# Patient Record
Sex: FEMALE | Race: OTHER | HISPANIC OR LATINO
[De-identification: names, ages, dates, MRNs, and addresses within clinical notes are randomized per-mention and may not be internally consistent; named-entity substitution may affect disease eponyms.]

---

## 2018-09-05 PROBLEM — Z00.00 ENCOUNTER FOR PREVENTIVE HEALTH EXAMINATION: Status: ACTIVE | Noted: 2018-09-05

## 2018-10-05 ENCOUNTER — APPOINTMENT (OUTPATIENT)
Dept: CARDIOLOGY | Facility: CLINIC | Age: 63
End: 2018-10-05
Payer: MEDICARE

## 2018-10-05 VITALS
HEART RATE: 68 BPM | WEIGHT: 129 LBS | DIASTOLIC BLOOD PRESSURE: 60 MMHG | BODY MASS INDEX: 23.74 KG/M2 | RESPIRATION RATE: 14 BRPM | SYSTOLIC BLOOD PRESSURE: 92 MMHG | HEIGHT: 62 IN

## 2018-10-05 DIAGNOSIS — Z78.9 OTHER SPECIFIED HEALTH STATUS: ICD-10-CM

## 2018-10-05 PROCEDURE — 99204 OFFICE O/P NEW MOD 45 MIN: CPT

## 2018-10-05 PROCEDURE — 99244 OFF/OP CNSLTJ NEW/EST MOD 40: CPT

## 2018-10-05 PROCEDURE — 93000 ELECTROCARDIOGRAM COMPLETE: CPT

## 2018-10-05 RX ORDER — OLMESARTAN MEDOXOMIL 40 MG/1
TABLET, FILM COATED ORAL DAILY
Refills: 0 | Status: DISCONTINUED | COMMUNITY
End: 2018-10-05

## 2018-10-09 ENCOUNTER — RESULT REVIEW (OUTPATIENT)
Age: 63
End: 2018-10-09

## 2018-10-09 LAB
25(OH)D3 SERPL-MCNC: 33.1 NG/ML
ALBUMIN SERPL ELPH-MCNC: 4.6 G/DL
ALP BLD-CCNC: 60 U/L
ALT SERPL-CCNC: 29 U/L
ANION GAP SERPL CALC-SCNC: 10 MMOL/L
AST SERPL-CCNC: 21 U/L
BILIRUB SERPL-MCNC: 0.4 MG/DL
BUN SERPL-MCNC: 23 MG/DL
CALCIUM SERPL-MCNC: 10.1 MG/DL
CHLORIDE SERPL-SCNC: 103 MMOL/L
CO2 SERPL-SCNC: 30 MMOL/L
CREAT SERPL-MCNC: 0.76 MG/DL
GLUCOSE SERPL-MCNC: 96 MG/DL
HBA1C MFR BLD HPLC: 5.8 %
POTASSIUM SERPL-SCNC: 5.1 MMOL/L
PROT SERPL-MCNC: 7.4 G/DL
SODIUM SERPL-SCNC: 142 MMOL/L
TSH SERPL-ACNC: 2 UIU/ML

## 2018-10-10 LAB
BASOPHILS # BLD AUTO: 0.05 K/UL
BASOPHILS NFR BLD AUTO: 0.6 %
CHOLEST SERPL-MCNC: 210 MG/DL
CHOLEST/HDLC SERPL: 3.5 RATIO
EOSINOPHIL # BLD AUTO: 0.22 K/UL
EOSINOPHIL NFR BLD AUTO: 2.5 %
HCT VFR BLD CALC: 44.1 %
HDLC SERPL-MCNC: 60 MG/DL
HGB BLD-MCNC: 13.5 G/DL
IMM GRANULOCYTES NFR BLD AUTO: 0.3 %
LDLC SERPL CALC-MCNC: 128 MG/DL
LYMPHOCYTES # BLD AUTO: 2.75 K/UL
LYMPHOCYTES NFR BLD AUTO: 31.2 %
MAN DIFF?: NORMAL
MCHC RBC-ENTMCNC: 28.8 PG
MCHC RBC-ENTMCNC: 30.6 GM/DL
MCV RBC AUTO: 94.2 FL
MONOCYTES # BLD AUTO: 0.71 K/UL
MONOCYTES NFR BLD AUTO: 8.1 %
NEUTROPHILS # BLD AUTO: 5.05 K/UL
NEUTROPHILS NFR BLD AUTO: 57.3 %
PLATELET # BLD AUTO: 330 K/UL
RBC # BLD: 4.68 M/UL
RBC # FLD: 13.4 %
TRIGL SERPL-MCNC: 109 MG/DL
WBC # FLD AUTO: 8.81 K/UL

## 2018-11-07 ENCOUNTER — APPOINTMENT (OUTPATIENT)
Dept: CARDIOLOGY | Facility: CLINIC | Age: 63
End: 2018-11-07
Payer: MEDICARE

## 2018-11-07 PROCEDURE — 93925 LOWER EXTREMITY STUDY: CPT

## 2018-11-12 ENCOUNTER — APPOINTMENT (OUTPATIENT)
Dept: CARDIOLOGY | Facility: CLINIC | Age: 63
End: 2018-11-12
Payer: MEDICARE

## 2018-11-12 PROCEDURE — 93015 CV STRESS TEST SUPVJ I&R: CPT

## 2018-11-12 PROCEDURE — 93306 TTE W/DOPPLER COMPLETE: CPT

## 2018-12-11 ENCOUNTER — APPOINTMENT (OUTPATIENT)
Dept: CARDIOLOGY | Facility: CLINIC | Age: 63
End: 2018-12-11
Payer: MEDICARE

## 2018-12-11 VITALS
HEART RATE: 84 BPM | BODY MASS INDEX: 24.84 KG/M2 | DIASTOLIC BLOOD PRESSURE: 70 MMHG | RESPIRATION RATE: 16 BRPM | SYSTOLIC BLOOD PRESSURE: 133 MMHG | HEIGHT: 62 IN | WEIGHT: 135 LBS

## 2018-12-11 PROCEDURE — 93000 ELECTROCARDIOGRAM COMPLETE: CPT

## 2018-12-11 PROCEDURE — 99212 OFFICE O/P EST SF 10 MIN: CPT

## 2019-01-11 ENCOUNTER — APPOINTMENT (OUTPATIENT)
Dept: CARDIOLOGY | Facility: CLINIC | Age: 64
End: 2019-01-11

## 2019-03-06 ENCOUNTER — APPOINTMENT (OUTPATIENT)
Dept: NEUROLOGY | Facility: CLINIC | Age: 64
End: 2019-03-06

## 2021-03-29 ENCOUNTER — APPOINTMENT (OUTPATIENT)
Dept: CARDIOLOGY | Facility: CLINIC | Age: 66
End: 2021-03-29
Payer: MEDICARE

## 2021-03-29 VITALS
SYSTOLIC BLOOD PRESSURE: 152 MMHG | HEART RATE: 96 BPM | RESPIRATION RATE: 16 BRPM | HEIGHT: 62 IN | DIASTOLIC BLOOD PRESSURE: 85 MMHG | WEIGHT: 128 LBS | BODY MASS INDEX: 23.55 KG/M2

## 2021-03-29 VITALS — SYSTOLIC BLOOD PRESSURE: 142 MMHG | DIASTOLIC BLOOD PRESSURE: 88 MMHG

## 2021-03-29 DIAGNOSIS — R09.89 OTHER SPECIFIED SYMPTOMS AND SIGNS INVOLVING THE CIRCULATORY AND RESPIRATORY SYSTEMS: ICD-10-CM

## 2021-03-29 PROCEDURE — 99214 OFFICE O/P EST MOD 30 MIN: CPT

## 2021-03-29 PROCEDURE — 93000 ELECTROCARDIOGRAM COMPLETE: CPT

## 2021-03-29 RX ORDER — GABAPENTIN 300 MG/1
300 CAPSULE ORAL DAILY
Refills: 0 | Status: DISCONTINUED | COMMUNITY
End: 2021-03-29

## 2021-03-29 RX ORDER — ATORVASTATIN CALCIUM 40 MG/1
40 TABLET, FILM COATED ORAL AT BEDTIME
Refills: 0 | Status: ACTIVE | COMMUNITY

## 2021-03-29 RX ORDER — ZOLPIDEM TARTRATE 10 MG/1
10 TABLET ORAL
Refills: 0 | Status: DISCONTINUED | COMMUNITY
Start: 2018-10-05 | End: 2021-03-29

## 2021-03-29 RX ORDER — ZOLPIDEM TARTRATE 10 MG/1
10 TABLET, FILM COATED ORAL
Refills: 0 | Status: ACTIVE | COMMUNITY

## 2021-03-29 NOTE — DISCUSSION/SUMMARY
[FreeTextEntry1] : 1).  Patient's blood pressure slightly elevated today while admitting to being under a great deal of stress recently.  She also has been getting some associated headaches from time to time.\par \par Counseled her on diet and lifestyle modification including low sodium, low fat and low carbohydrate diet. She is to implement aerobic exercise regimen few days per week for improving overall cardiovascular health and stress management. \par \par Will consider starting her on hypertensive medication if blood pressure still high on return office visit.\par \par 2).  She is to complete a Transthoracic Echocardiogram and Carotid Doppler in the near future to assess cardiac function, valvulopathy and for signs of cardiomegaly as well as carotid plaquing stenosis respectively.\par \par Patient given blood work script to be done prior to follow up visit.\par \par 3).  Follow up with PCP (Dr. Miller) regarding routine checkups and blood work, have copy faxed to our office. \par \par 4).  Recommend patient report any untoward symptoms. \par \par 5).  Follow up with our office in 1 month or PRN.

## 2021-03-29 NOTE — ASSESSMENT
[FreeTextEntry1] : EKG 3/29/2021:  The EKG illustrates normal sinus rhythm, rate of 86 bpm, rSR prime V1, general low voltage tracing throughout.  Essentially unchanged.\par \par

## 2021-03-29 NOTE — PHYSICAL EXAM
[General Appearance - Well Developed] : well developed [Normal Appearance] : normal appearance [General Appearance - Well Nourished] : well nourished [General Appearance - In No Acute Distress] : no acute distress [Normal Conjunctiva] : the conjunctiva exhibited no abnormalities [Normal Oral Mucosa] : normal oral mucosa [Normal Jugular Venous A Waves Present] : normal jugular venous A waves present [Normal Jugular Venous V Waves Present] : normal jugular venous V waves present [No Jugular Venous Butterfield A Waves] : no jugular venous butterfield A waves [] : no respiratory distress [Respiration, Rhythm And Depth] : normal respiratory rhythm and effort [Auscultation Breath Sounds / Voice Sounds] : lungs were clear to auscultation bilaterally [Heart Rate And Rhythm] : heart rate and rhythm were normal [Heart Sounds] : normal S1 and S2 [Edema] : no peripheral edema present [Bowel Sounds] : normal bowel sounds [Abnormal Walk] : normal gait [Nail Clubbing] : no clubbing of the fingernails [Cyanosis, Localized] : no localized cyanosis [Skin Color & Pigmentation] : normal skin color and pigmentation [Oriented To Time, Place, And Person] : oriented to person, place, and time [Impaired Insight] : insight and judgment were intact [Affect] : the affect was normal [FreeTextEntry1] : slightly anxious

## 2021-03-29 NOTE — HISTORY OF PRESENT ILLNESS
[FreeTextEntry1] : She does admit to being under a great deal of stress lately and is wondering if that could be the problem;\par \par Patient completed an Echocardiogram, Exercise Stress test and 24 Hour Holter monitor in 2018 which revealed no significant cardiac abnormalities at that time;\par \par Tolerating current medical regimen without difficulty including Atorvastatin daily;\par \par Transthoracic Echocardiogram (11/12/2018):  Normal LV size and function with an LVEF of 55 to 60%. The left and right atria normal in size and structure.  There is trace AR, MR and UT;\par \par Exercise Stress Test (11/12/2018):  Patient exercised for 5 METS and developed leg fatigue and dizziness during the test that resolved with rest.  The resting blood pressure was (122/80).  Patient developed no dysrhythmias. The EKG was negative for ischemia;\par \par 24 Hour Holter monitor (10/5/2018):  The presenting rhythm was sinus with average heart rate of 78 bpm (Max 142, Min 51). There were no episodes of VT or SVT, no ventricular premature beats, no significant pauses or blocks. There was 1 isolated PAC noted;

## 2021-03-29 NOTE — REASON FOR VISIT
[FreeTextEntry1] : The patient is a 65-year-old Cameroonian speaking female with known history of hyperlipidemia and mildly abnormal EKG who presents today with having some unusually elevated blood pressures lately, she reports averaging in the 150's to 160's over 90's;\par \par Spoke to patient via using  with [employer];\par \par She also has previous history for experiencing palpitations and shortness of breath but states this has not been an issue for awhile;\par \par She does report experiencing some headaches lately as well but no other associated symptoms such as CP, SOB, MAYER, palpitations, presyncope, syncope, edema;

## 2021-04-15 ENCOUNTER — APPOINTMENT (OUTPATIENT)
Dept: CARDIOLOGY | Facility: CLINIC | Age: 66
End: 2021-04-15
Payer: MEDICARE

## 2021-04-15 PROCEDURE — 93880 EXTRACRANIAL BILAT STUDY: CPT

## 2021-04-22 ENCOUNTER — APPOINTMENT (OUTPATIENT)
Dept: CARDIOLOGY | Facility: CLINIC | Age: 66
End: 2021-04-22
Payer: MEDICARE

## 2021-04-22 PROCEDURE — 93306 TTE W/DOPPLER COMPLETE: CPT

## 2021-04-29 ENCOUNTER — APPOINTMENT (OUTPATIENT)
Dept: CARDIOLOGY | Facility: CLINIC | Age: 66
End: 2021-04-29
Payer: MEDICARE

## 2021-04-29 ENCOUNTER — NON-APPOINTMENT (OUTPATIENT)
Age: 66
End: 2021-04-29

## 2021-04-29 VITALS
HEIGHT: 60 IN | RESPIRATION RATE: 16 BRPM | BODY MASS INDEX: 24.94 KG/M2 | WEIGHT: 127 LBS | SYSTOLIC BLOOD PRESSURE: 136 MMHG | DIASTOLIC BLOOD PRESSURE: 86 MMHG | TEMPERATURE: 98.2 F | HEART RATE: 78 BPM

## 2021-04-29 DIAGNOSIS — G62.9 POLYNEUROPATHY, UNSPECIFIED: ICD-10-CM

## 2021-04-29 PROCEDURE — 93000 ELECTROCARDIOGRAM COMPLETE: CPT

## 2021-04-29 PROCEDURE — 99214 OFFICE O/P EST MOD 30 MIN: CPT

## 2021-04-29 RX ORDER — ASPIRIN ENTERIC COATED TABLETS 81 MG 81 MG/1
81 TABLET, DELAYED RELEASE ORAL DAILY
Qty: 90 | Refills: 1 | Status: ACTIVE | COMMUNITY

## 2021-04-29 RX ORDER — MULTIVIT,IRON,MINERALS/LUTEIN
TABLET ORAL DAILY
Refills: 0 | Status: ACTIVE | COMMUNITY

## 2021-04-29 RX ORDER — MELOXICAM 15 MG/1
15 TABLET ORAL
Refills: 0 | Status: DISCONTINUED | COMMUNITY
End: 2021-04-29

## 2021-04-29 NOTE — REASON FOR VISIT
[FreeTextEntry1] : The patient is a pleasant 66-year-old  female with a past medical history significant for hyperlipidemia, GERD, palpitations, mild valvular abnormalities, who presents for follow up evaluation.

## 2021-04-29 NOTE — REVIEW OF SYSTEMS
[Numbness (Hypoesthesia)] : numbness [Tingling (Paresthesia)] : tingling [Negative] : Heme/Lymph [FreeTextEntry9] : Left foot pain [de-identified] : left foot

## 2021-04-29 NOTE — HISTORY OF PRESENT ILLNESS
[FreeTextEntry1] : Mrs. Mendoza presents today feeling well.  Denies complaints of chest pain, shortness of breath, palpitations, lightheadedness or syncope.  States her blood pressures have been running much better at home 120-130s/70-80s.  Her main complaint is that of intermittent left foot pain with paresthesias.  Has had 2 surgeries and spinal injections with no relief.  Had a negative arterial duplex in 2018 with Dr. Payan which was negative for atherosclerotic plaque or hemodynamically significant stenosis of the lower extremites.

## 2021-04-29 NOTE — DISCUSSION/SUMMARY
[FreeTextEntry1] : 1 - Hypertension:  blood pressure well controlled.  Patient advised to follow strict low sodium diet.\par \par 2 - Hyperlipidemia:  cholesterol 134, triglycerides 111, HDL 53, LDL 61.  Continue Atorvastatin 40mg daily.  Follow low fat, low cholesterol diet.  Will have blood work through PCPs office and labs to be forwarded to us.\par \par 3 - Echocardiogram (4/22/2021):  EF 60-65%.  RIght and left atria are normal in size and structure.  There is a lipomatous hypertrophy of the interatrial septum, without evidence of shunting.  Mild aortic valve sclerosis without stenosis.  Trace aortic regurgitation.  Trace mitral valve regurgitation.\par \par 4 - Carotid duplex (4/15/2021):  normal bilateral carotid duplex ultrasound exam.\par \par 5 - Left foot pain/paresthesias:  Patient with negative arterial duplex in 2018, has been followed by ortho and had 2 surgeries, and spinal injections without relief.  Will refer to Dr. Rose Velásquez for further evaluation.\par \par 6 - Follow up with Dr. Payan in 1 year.

## 2021-04-29 NOTE — PHYSICAL EXAM
[Well Developed] : well developed [Well Nourished] : well nourished [No Acute Distress] : no acute distress [Normal Conjunctiva] : normal conjunctiva [Normal Venous Pressure] : normal venous pressure [No Carotid Bruit] : no carotid bruit [Normal S1, S2] : normal S1, S2 [No Murmur] : no murmur [No Rub] : no rub [No Gallop] : no gallop [Clear Lung Fields] : clear lung fields [Good Air Entry] : good air entry [No Respiratory Distress] : no respiratory distress  [Normal Bowel Sounds] : normal bowel sounds [Normal Gait] : normal gait [No Edema] : no edema [No Cyanosis] : no cyanosis [No Clubbing] : no clubbing [No Varicosities] : no varicosities [No Rash] : no rash [No Skin Lesions] : no skin lesions [Moves all extremities] : moves all extremities [No Focal Deficits] : no focal deficits [Normal Speech] : normal speech [Alert and Oriented] : alert and oriented [Normal memory] : normal memory

## 2023-04-28 ENCOUNTER — APPOINTMENT (OUTPATIENT)
Dept: CARDIOLOGY | Facility: CLINIC | Age: 68
End: 2023-04-28
Payer: MEDICARE

## 2023-04-28 VITALS
BODY MASS INDEX: 25.13 KG/M2 | DIASTOLIC BLOOD PRESSURE: 72 MMHG | WEIGHT: 128 LBS | HEART RATE: 78 BPM | SYSTOLIC BLOOD PRESSURE: 114 MMHG | HEIGHT: 60 IN | RESPIRATION RATE: 16 BRPM

## 2023-04-28 PROCEDURE — 93000 ELECTROCARDIOGRAM COMPLETE: CPT

## 2023-04-28 PROCEDURE — 99214 OFFICE O/P EST MOD 30 MIN: CPT

## 2023-04-28 RX ORDER — RANITIDINE HYDROCHLORIDE 150 MG/1
150 CAPSULE ORAL DAILY
Refills: 0 | Status: DISCONTINUED | COMMUNITY
End: 2023-04-28

## 2023-04-28 RX ORDER — PREGABALIN 100 MG/1
100 CAPSULE ORAL
Refills: 0 | Status: ACTIVE | COMMUNITY

## 2023-05-23 ENCOUNTER — NON-APPOINTMENT (OUTPATIENT)
Age: 68
End: 2023-05-23

## 2023-05-23 ENCOUNTER — APPOINTMENT (OUTPATIENT)
Dept: CARDIOLOGY | Facility: CLINIC | Age: 68
End: 2023-05-23
Payer: MEDICARE

## 2023-05-23 VITALS
SYSTOLIC BLOOD PRESSURE: 128 MMHG | WEIGHT: 128 LBS | BODY MASS INDEX: 25.13 KG/M2 | RESPIRATION RATE: 16 BRPM | HEART RATE: 76 BPM | HEIGHT: 60 IN | DIASTOLIC BLOOD PRESSURE: 78 MMHG

## 2023-05-23 DIAGNOSIS — F41.9 ANXIETY DISORDER, UNSPECIFIED: ICD-10-CM

## 2023-05-23 DIAGNOSIS — M79.604 PAIN IN RIGHT LEG: ICD-10-CM

## 2023-05-23 DIAGNOSIS — M79.605 PAIN IN RIGHT LEG: ICD-10-CM

## 2023-05-23 PROCEDURE — 99214 OFFICE O/P EST MOD 30 MIN: CPT

## 2023-05-23 PROCEDURE — 93000 ELECTROCARDIOGRAM COMPLETE: CPT

## 2023-05-23 RX ORDER — AMLODIPINE BESYLATE 5 MG/1
5 TABLET ORAL DAILY
Qty: 90 | Refills: 1 | Status: ACTIVE | COMMUNITY
Start: 2023-05-23 | End: 1900-01-01

## 2023-05-23 RX ORDER — ENALAPRIL MALEATE 5 MG/1
5 TABLET ORAL DAILY
Refills: 0 | Status: DISCONTINUED | COMMUNITY
End: 2023-05-23

## 2023-05-23 NOTE — REASON FOR VISIT
[FreeTextEntry1] : The patient is a pleasant 68-year-old  female with a past medical history significant for hyperlipidemia, GERD, palpitations, mild valvular abnormalities, who presents for follow up evaluation.

## 2023-05-23 NOTE — HISTORY OF PRESENT ILLNESS
[FreeTextEntry1] : Mrs. Mendoza presents today stating that over the past several weeks she has "not been feeling well" and her blood pressure has been elevated. (138-157/78-94, average 150/88).  Has an overwhelming feeling come over her, her hands and feet get very cold and experiences "a very fast heart beat."  She restarted taking her enalapril 5mg daily when she started getting these high readings.  She does not feel that "the medication is working."  Continues to experience the numbness and tingling in her middle toe on her left foot.  Was seen by her PCP on Saturday and he "did some tests."  She has not gotten the results yet.

## 2023-05-23 NOTE — DISCUSSION/SUMMARY
[EKG obtained to assist in diagnosis and management of assessed problem(s)] : EKG obtained to assist in diagnosis and management of assessed problem(s) [FreeTextEntry1] : 1 - Hypertension:  blood pressure well controlled.  Patient has not been taking enalapril 5mg daily for quite some time.  Follow low sodium diet.\par \par 2 - Hyperlipidemia:  no recent labs.  Continue Atorvastatin 40mg daily.  Follow low fat, low cholesterol diet.  Fasting blood work prior to follow up visit.\par \par 3 - Left foot pain/paresthesias:  numbness/tingling of her middle toe on her left foot and occasional coolness to that foot.  She has has surgery twice on that foot and had negative arterial duplex back in 2018 with same symptoms.  Recommended vascular surgery at that time and did not go.\par \par 4 - Follow up with Dr. Payan in 3 months.

## 2023-05-23 NOTE — CARDIOLOGY SUMMARY
[de-identified] : Sinus rhythm at 76 bpm.  Low voltage in precordial leads.  Non-specific ST-T changes.

## 2023-05-23 NOTE — REVIEW OF SYSTEMS
[Palpitations] : palpitations [Numbness (Hypoesthesia)] : numbness [Tingling (Paresthesia)] : tingling [Anxiety] : anxiety [Negative] : Heme/Lymph [FreeTextEntry9] : Left foot pain [de-identified] : left foot

## 2023-05-23 NOTE — PHYSICAL EXAM
[Well Developed] : well developed [Well Nourished] : well nourished [No Acute Distress] : no acute distress [Normal Conjunctiva] : normal conjunctiva [Normal Venous Pressure] : normal venous pressure [No Carotid Bruit] : no carotid bruit [Normal S1, S2] : normal S1, S2 [No Murmur] : no murmur [No Rub] : no rub [No Gallop] : no gallop [Clear Lung Fields] : clear lung fields [No Respiratory Distress] : no respiratory distress  [Normal Bowel Sounds] : normal bowel sounds [Normal Gait] : normal gait [No Edema] : no edema [No Rash] : no rash [No Skin Lesions] : no skin lesions [Moves all extremities] : moves all extremities [No Focal Deficits] : no focal deficits [Normal Speech] : normal speech [Alert and Oriented] : alert and oriented [Normal memory] : normal memory

## 2023-05-23 NOTE — REVIEW OF SYSTEMS
[Numbness (Hypoesthesia)] : numbness [Tingling (Paresthesia)] : tingling [Negative] : Heme/Lymph [FreeTextEntry9] : Left foot pain [de-identified] : left foot

## 2023-05-23 NOTE — HISTORY OF PRESENT ILLNESS
[FreeTextEntry1] : Mrs. Mendoza presents today after not following up with us in 2 years.  She is presently without complaints of exertional chest pain, shortness of breath, palpitations, lightheadedness or syncope.  Admits that she has not taken enalapril in a very long time.  Her biggest complaint is that of numbness/tingling of her middle toe on her left foot and occasional coolness to that foot.  She has has surgery twice on that foot.  She was experiencing this same issue when last seen two years ago.
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
## 2023-06-23 ENCOUNTER — APPOINTMENT (OUTPATIENT)
Dept: CARDIOLOGY | Facility: CLINIC | Age: 68
End: 2023-06-23

## 2023-06-29 ENCOUNTER — APPOINTMENT (OUTPATIENT)
Dept: CARDIOLOGY | Facility: CLINIC | Age: 68
End: 2023-06-29
Payer: MEDICARE

## 2023-06-29 PROCEDURE — 93306 TTE W/DOPPLER COMPLETE: CPT

## 2023-07-20 ENCOUNTER — NON-APPOINTMENT (OUTPATIENT)
Age: 68
End: 2023-07-20

## 2023-07-20 ENCOUNTER — APPOINTMENT (OUTPATIENT)
Dept: CARDIOLOGY | Facility: CLINIC | Age: 68
End: 2023-07-20
Payer: MEDICARE

## 2023-07-20 VITALS
RESPIRATION RATE: 14 BRPM | WEIGHT: 128 LBS | BODY MASS INDEX: 25.13 KG/M2 | HEIGHT: 60 IN | DIASTOLIC BLOOD PRESSURE: 80 MMHG | SYSTOLIC BLOOD PRESSURE: 124 MMHG | HEART RATE: 89 BPM

## 2023-07-20 PROCEDURE — 93000 ELECTROCARDIOGRAM COMPLETE: CPT

## 2023-07-20 PROCEDURE — 99214 OFFICE O/P EST MOD 30 MIN: CPT

## 2023-07-20 NOTE — CARDIOLOGY SUMMARY
[de-identified] : SInus rhythm at 89 bpm.  Low voltage in precordial leads.  Non-specific ST-T changes.

## 2023-07-20 NOTE — DISCUSSION/SUMMARY
[FreeTextEntry1] : 1 - Hypertension:  blood pressure well controlled on current medications.  Advised to follow low sodium diet.\par \par 2 - Valvular heart disease:  patient with known mild to moderate mitral valve regurgitation.  Had follow up echocardiogram (6/29/2023) which revealed normal left ventricular cavity size with EF of 60-65%.  The right and left atria are normal in size and structure.  Lipomatous interatrial septal hypertrophy present.  Trace aortic regurgitation.  Trivial mitral regurgitation.  Trace tricuspid regurgitation.  The inferior vena cava is dilated measuring 2.4 cm in diameter.  Mildly elevated right atrial pressure.  Estimated pulmonary artery systolic pressure is 21 mmHg.\par \par 3 - Hyperlipidemia:  no recent labs.  Continue Atorvastatin 40mg daily.  Follow low fat, low cholesterol diet. Patient is scheduled to have fasting blood work on Saturday.  Will ask to have results forwarded to our office once available.\par \par 4 - Palpitations:  Has not experienced any further palpitations or "fast heart beat."  Wore the 30-day ambulatory event monitor just for a couple of days because she "had a lot going on."  2 days of reports revealed baseline rhythm of sinus with average heart rate of 85 bpm (max 143, min 62.  No atrial fibrillation, SVT, pauses, heart block or VT.  PVC and PAC burden of zero%.  Keeps caffeine intake to a minimum, hydrates well and has been getting adequate sleep.  Will continue to monitor herself and will call our office should she begin to experience the palpitations again.  If this does occur we can reorder the ambulatory event monitor for further evaluation, but at this time, the patient states that she is feeling "great."\par \par 5 - Follow up with Dr. Payan in 3 months. [EKG obtained to assist in diagnosis and management of assessed problem(s)] : EKG obtained to assist in diagnosis and management of assessed problem(s)

## 2023-11-17 NOTE — HISTORY OF PRESENT ILLNESS
[FreeTextEntry1] : Mrs. Mendoza presents today feeling "great".  She is without complaints of exertional chest pain, shortness of breath, lightheadedness or syncope.  Has not experienced any further palpitations or "fast heart beat."  States she is tolerating the addition of amlodipine very well as it has been maintaining her blood pressure well controlled.   Patient with one or more new problems requiring additional work-up/treatment.

## 2023-12-06 ENCOUNTER — INPATIENT (INPATIENT)
Facility: HOSPITAL | Age: 68
LOS: 0 days | Discharge: HOME | DRG: 694 | End: 2023-12-07
Attending: STUDENT IN AN ORGANIZED HEALTH CARE EDUCATION/TRAINING PROGRAM | Admitting: STUDENT IN AN ORGANIZED HEALTH CARE EDUCATION/TRAINING PROGRAM
Payer: COMMERCIAL

## 2023-12-06 VITALS
RESPIRATION RATE: 16 BRPM | OXYGEN SATURATION: 98 % | WEIGHT: 123.46 LBS | SYSTOLIC BLOOD PRESSURE: 153 MMHG | DIASTOLIC BLOOD PRESSURE: 83 MMHG | TEMPERATURE: 99 F | HEART RATE: 82 BPM

## 2023-12-06 DIAGNOSIS — N20.0 CALCULUS OF KIDNEY: ICD-10-CM

## 2023-12-06 LAB
ALBUMIN SERPL ELPH-MCNC: 4.5 G/DL — SIGNIFICANT CHANGE UP (ref 3.3–5.2)
ALP SERPL-CCNC: 83 U/L — SIGNIFICANT CHANGE UP (ref 40–120)
ALT FLD-CCNC: 21 U/L — SIGNIFICANT CHANGE UP
ANION GAP SERPL CALC-SCNC: 12 MMOL/L — SIGNIFICANT CHANGE UP (ref 5–17)
APPEARANCE UR: ABNORMAL
AST SERPL-CCNC: 17 U/L — SIGNIFICANT CHANGE UP
BACTERIA # UR AUTO: NEGATIVE /HPF — SIGNIFICANT CHANGE UP
BASOPHILS # BLD AUTO: 0.05 K/UL — SIGNIFICANT CHANGE UP (ref 0–0.2)
BASOPHILS NFR BLD AUTO: 0.3 % — SIGNIFICANT CHANGE UP (ref 0–2)
BILIRUB SERPL-MCNC: 0.4 MG/DL — SIGNIFICANT CHANGE UP (ref 0.4–2)
BILIRUB UR-MCNC: NEGATIVE — SIGNIFICANT CHANGE UP
BUN SERPL-MCNC: 17.9 MG/DL — SIGNIFICANT CHANGE UP (ref 8–20)
CALCIUM SERPL-MCNC: 10.1 MG/DL — SIGNIFICANT CHANGE UP (ref 8.4–10.5)
CAST: 1 /LPF — SIGNIFICANT CHANGE UP (ref 0–4)
CHLORIDE SERPL-SCNC: 104 MMOL/L — SIGNIFICANT CHANGE UP (ref 96–108)
CO2 SERPL-SCNC: 28 MMOL/L — SIGNIFICANT CHANGE UP (ref 22–29)
COLOR SPEC: YELLOW — SIGNIFICANT CHANGE UP
CREAT SERPL-MCNC: 0.51 MG/DL — SIGNIFICANT CHANGE UP (ref 0.5–1.3)
DIFF PNL FLD: ABNORMAL
EGFR: 102 ML/MIN/1.73M2 — SIGNIFICANT CHANGE UP
EOSINOPHIL # BLD AUTO: 0 K/UL — SIGNIFICANT CHANGE UP (ref 0–0.5)
EOSINOPHIL NFR BLD AUTO: 0 % — SIGNIFICANT CHANGE UP (ref 0–6)
GLUCOSE SERPL-MCNC: 142 MG/DL — HIGH (ref 70–99)
GLUCOSE UR QL: NEGATIVE MG/DL — SIGNIFICANT CHANGE UP
HCT VFR BLD CALC: 44 % — SIGNIFICANT CHANGE UP (ref 34.5–45)
HGB BLD-MCNC: 14.6 G/DL — SIGNIFICANT CHANGE UP (ref 11.5–15.5)
IMM GRANULOCYTES NFR BLD AUTO: 0.4 % — SIGNIFICANT CHANGE UP (ref 0–0.9)
KETONES UR-MCNC: NEGATIVE MG/DL — SIGNIFICANT CHANGE UP
LACTATE BLDV-MCNC: 2.8 MMOL/L — HIGH (ref 0.5–2)
LACTATE BLDV-MCNC: 3 MMOL/L — HIGH (ref 0.5–2)
LEUKOCYTE ESTERASE UR-ACNC: NEGATIVE — SIGNIFICANT CHANGE UP
LIDOCAIN IGE QN: 23 U/L — SIGNIFICANT CHANGE UP (ref 22–51)
LYMPHOCYTES # BLD AUTO: 0.9 K/UL — LOW (ref 1–3.3)
LYMPHOCYTES # BLD AUTO: 5.1 % — LOW (ref 13–44)
MCHC RBC-ENTMCNC: 30.6 PG — SIGNIFICANT CHANGE UP (ref 27–34)
MCHC RBC-ENTMCNC: 33.2 GM/DL — SIGNIFICANT CHANGE UP (ref 32–36)
MCV RBC AUTO: 92.2 FL — SIGNIFICANT CHANGE UP (ref 80–100)
MONOCYTES # BLD AUTO: 0.83 K/UL — SIGNIFICANT CHANGE UP (ref 0–0.9)
MONOCYTES NFR BLD AUTO: 4.7 % — SIGNIFICANT CHANGE UP (ref 2–14)
NEUTROPHILS # BLD AUTO: 15.96 K/UL — HIGH (ref 1.8–7.4)
NEUTROPHILS NFR BLD AUTO: 89.5 % — HIGH (ref 43–77)
NITRITE UR-MCNC: NEGATIVE — SIGNIFICANT CHANGE UP
OB PNL STL: NEGATIVE — SIGNIFICANT CHANGE UP
PH UR: 8 — SIGNIFICANT CHANGE UP (ref 5–8)
PLATELET # BLD AUTO: 320 K/UL — SIGNIFICANT CHANGE UP (ref 150–400)
POTASSIUM SERPL-MCNC: 4.2 MMOL/L — SIGNIFICANT CHANGE UP (ref 3.5–5.3)
POTASSIUM SERPL-SCNC: 4.2 MMOL/L — SIGNIFICANT CHANGE UP (ref 3.5–5.3)
PROT SERPL-MCNC: 7.3 G/DL — SIGNIFICANT CHANGE UP (ref 6.6–8.7)
PROT UR-MCNC: NEGATIVE MG/DL — SIGNIFICANT CHANGE UP
RBC # BLD: 4.77 M/UL — SIGNIFICANT CHANGE UP (ref 3.8–5.2)
RBC # FLD: 12.2 % — SIGNIFICANT CHANGE UP (ref 10.3–14.5)
RBC CASTS # UR COMP ASSIST: 10 /HPF — HIGH (ref 0–4)
SODIUM SERPL-SCNC: 144 MMOL/L — SIGNIFICANT CHANGE UP (ref 135–145)
SP GR SPEC: 1.01 — SIGNIFICANT CHANGE UP (ref 1–1.03)
SQUAMOUS # UR AUTO: 0 /HPF — SIGNIFICANT CHANGE UP (ref 0–5)
TROPONIN T, HIGH SENSITIVITY RESULT: 7 NG/L — SIGNIFICANT CHANGE UP (ref 0–51)
UROBILINOGEN FLD QL: 0.2 MG/DL — SIGNIFICANT CHANGE UP (ref 0.2–1)
WBC # BLD: 17.81 K/UL — HIGH (ref 3.8–10.5)
WBC # FLD AUTO: 17.81 K/UL — HIGH (ref 3.8–10.5)
WBC UR QL: 2 /HPF — SIGNIFICANT CHANGE UP (ref 0–5)

## 2023-12-06 PROCEDURE — 99285 EMERGENCY DEPT VISIT HI MDM: CPT | Mod: FS

## 2023-12-06 PROCEDURE — 93010 ELECTROCARDIOGRAM REPORT: CPT

## 2023-12-06 PROCEDURE — 74177 CT ABD & PELVIS W/CONTRAST: CPT | Mod: 26,MA

## 2023-12-06 RX ORDER — CEFTRIAXONE 500 MG/1
1000 INJECTION, POWDER, FOR SOLUTION INTRAMUSCULAR; INTRAVENOUS ONCE
Refills: 0 | Status: DISCONTINUED | OUTPATIENT
Start: 2023-12-06 | End: 2023-12-06

## 2023-12-06 RX ORDER — CEFTRIAXONE 500 MG/1
1000 INJECTION, POWDER, FOR SOLUTION INTRAMUSCULAR; INTRAVENOUS ONCE
Refills: 0 | Status: COMPLETED | OUTPATIENT
Start: 2023-12-06 | End: 2023-12-06

## 2023-12-06 RX ORDER — SODIUM CHLORIDE 9 MG/ML
750 INJECTION INTRAMUSCULAR; INTRAVENOUS; SUBCUTANEOUS ONCE
Refills: 0 | Status: COMPLETED | OUTPATIENT
Start: 2023-12-06 | End: 2023-12-06

## 2023-12-06 RX ORDER — SODIUM CHLORIDE 9 MG/ML
1000 INJECTION INTRAMUSCULAR; INTRAVENOUS; SUBCUTANEOUS ONCE
Refills: 0 | Status: COMPLETED | OUTPATIENT
Start: 2023-12-06 | End: 2023-12-06

## 2023-12-06 RX ORDER — KETOROLAC TROMETHAMINE 30 MG/ML
15 SYRINGE (ML) INJECTION ONCE
Refills: 0 | Status: DISCONTINUED | OUTPATIENT
Start: 2023-12-06 | End: 2023-12-06

## 2023-12-06 RX ORDER — ONDANSETRON 8 MG/1
4 TABLET, FILM COATED ORAL ONCE
Refills: 0 | Status: COMPLETED | OUTPATIENT
Start: 2023-12-06 | End: 2023-12-06

## 2023-12-06 RX ORDER — MORPHINE SULFATE 50 MG/1
4 CAPSULE, EXTENDED RELEASE ORAL ONCE
Refills: 0 | Status: DISCONTINUED | OUTPATIENT
Start: 2023-12-06 | End: 2023-12-06

## 2023-12-06 RX ADMIN — SODIUM CHLORIDE 750 MILLILITER(S): 9 INJECTION INTRAMUSCULAR; INTRAVENOUS; SUBCUTANEOUS at 18:14

## 2023-12-06 RX ADMIN — ONDANSETRON 4 MILLIGRAM(S): 8 TABLET, FILM COATED ORAL at 17:31

## 2023-12-06 RX ADMIN — SODIUM CHLORIDE 1000 MILLILITER(S): 9 INJECTION INTRAMUSCULAR; INTRAVENOUS; SUBCUTANEOUS at 17:31

## 2023-12-06 RX ADMIN — MORPHINE SULFATE 4 MILLIGRAM(S): 50 CAPSULE, EXTENDED RELEASE ORAL at 20:51

## 2023-12-06 RX ADMIN — CEFTRIAXONE 1000 MILLIGRAM(S): 500 INJECTION, POWDER, FOR SOLUTION INTRAMUSCULAR; INTRAVENOUS at 18:14

## 2023-12-06 RX ADMIN — Medication 15 MILLIGRAM(S): at 17:31

## 2023-12-06 NOTE — ED ADULT NURSE NOTE - OBJECTIVE STATEMENT
c/o right flank pain x1 mo but pain worsen today along with n/v. also endorse painful urination. AO4. Sepsis workup.

## 2023-12-06 NOTE — ED ADULT NURSE NOTE - NS ED NURSE PATIENT LEFT UNIT TIME
You may be receiving a patient experience survey by mail or e-mail from the Urgent Care Staff regarding your visit today. We value your feedback and hope you can provide us your insight. Take the antibiotic as directed. Lots of fluids. Follow up with new or worsening symptoms. Patient Education     Urinary Tract Infections in Women  Urinary tract infections (UTIs) are most often caused by bacteria (germs). These bacteria enter the urinary tract. The bacteria may come from outside the body. Or they may travel from the skin outside theÂ rectum or vagina into the urethra. Female anatomy makes it easier for bacteria from the bowelÂ to enter a womanâs urinary tract, which is the most common source of UTI. This means women develop UTIs more often than men. Pain in or around the urinary tract is a common UTI symptom. But the only way to know for sure if you have a UTI for the health care provider to test your urine. The two tests that may be done are the urinalysis and urine culture. Types of UTIs  Â· Cystitis: A bladder infection (cystitis) is the most common UTI in women. You may have urgent or frequent urination. You may also haveÂ pain, burning when you urinate, and bloody urine. Â· Urethritis: This is an inflamed urethra, which is the tube that carries urine from the bladder to outside the body. You may have lower stomach or back pain. You may also have urgent or frequent urination. Â· Pyelonephritis: This is a kidney infection. If not treated, it can be serious and damage your kidneys. In severe cases, you may be hospitalized. You may have a fever and lower back pain. Medications to treat a UTI  Most UTIs are treated with antibiotics. These kill the bacteria. The length of time you need to take them depends on the type of infection. It may be as short as 3 days. If you have repeated UTIs, a low-dose antibiotic may be needed for several months. Take antibiotics exactly as directed.  Donât stop taking them until all of the medication is gone. If you stop taking the antibiotic too soon, the infection may not go away, and you may develop a resistance to the antibiotic. This can make it much harder to treat. Lifestyle changes to treat and prevent UTIs  The lifestyle changes below will help get rid of your UTI. They may also help prevent future UTIs. Â· Drink plenty of fluids. This includes water, juice, or other caffeine-free drinks. Fluids help flush bacteria out of your body. Â· Empty your bladder. Always empty your bladder when you feel the urge to urinate. And always urinate before going to sleep. Urine that stays in your bladder can lead to infection. Try to urinate before and after sex as well. Â· Practice good personal hygiene. Wipe yourself from front to back after using the toilet. This helps keep bacteria from getting into the urethra. Â· Use condoms during sex. These help prevent UTIs caused by sexually transmitted bacteria. Also, avoid using spermicides during sex. These can increase the risk of UTIs. Choose other forms of birth control instead. For women who tend to get UTIs after sex, a low-dose of a preventive antibiotic may be used. Be sure to discuss this option with your health care provider. Â· Follow up with your health care provider as directed. He or she may test to make sure the infection has cleared. If necessary, additional treatment may be started. Â© [address] [facility] [place] [address] [address], [place], [address]. All rights reserved. This information is not intended as a substitute for professional medical care. Always follow your healthcare professional's instructions. 01:06

## 2023-12-06 NOTE — ED ADULT NURSE NOTE - NSFALLUNIVINTERV_ED_ALL_ED
Bed/Stretcher in lowest position, wheels locked, appropriate side rails in place/Call bell, personal items and telephone in reach/Instruct patient to call for assistance before getting out of bed/chair/stretcher/Non-slip footwear applied when patient is off stretcher/[place] to call system/Physically safe environment - no spills, clutter or unnecessary equipment/Purposeful proactive rounding/Room/bathroom lighting operational, light cord in reach Bed/Stretcher in lowest position, wheels locked, appropriate side rails in place/Call bell, personal items and telephone in reach/Instruct patient to call for assistance before getting out of bed/chair/stretcher/Non-slip footwear applied when patient is off stretcher/[place] to call system/Physically safe environment - no spills, clutter or unnecessary equipment/Purposeful proactive rounding/Room/bathroom lighting operational, light cord in reach

## 2023-12-06 NOTE — ED PROVIDER NOTE - CLINICAL SUMMARY MEDICAL DECISION MAKING FREE TEXT BOX
Hx and physical as noted. concern for pyelonephritis vs infected renal stone. no concern for dissection at this time given clinical picture. pulses equal in upper extremities. low suspicion for cardiac etiology as pt has epigastric tenderness. will get screening EKG, troponin, imaging and symptom control. disposition and further intervention pending. Hx and physical as noted. concern for pyelonephritis vs infected renal stone. no concern for dissection at this time given clinical picture. pulses equal in lower and upper extremities. low suspicion for cardiac etiology to explain epigastric pain, current clinical picture more consistent with  etiology. will get screening EKG, troponin (epigastric pain without tenderness), imaging and symptom control. disposition and further intervention pending.

## 2023-12-06 NOTE — ED PROVIDER NOTE - NS ED ROS FT
REVIEW OF SYSTEMS:  General:  (+) fever, no chills  HEENT: no headache, no vision changes  Cardiac: no chest pain, no palpitations  Respiratory: no cough, no shortness of breath  Gastrointestinal: (+) abdominal pain, (+) nausea, (+) vomiting, no diarrhea, no melena, no hematochezia   Genitourinary: no hematuria, (+) dysuria, no urinary frequency, no urinary hesitancy, no vaginal bleeding or abnormal discharge, (+) black stool  Extremities: no extremity swelling, no extremity pain  Neuro: no focal weakness, no numbness/tingling of the extremities, no decreased sensation  Heme: no easy bleeding, no easy bruising, no anemia  Skin: no abrasions, no jaundice, no pruritis, no rashes, no lesions  -Gisella Soriano MD Attending Physician REVIEW OF SYSTEMS:  General:  (+) fever, no chills  HEENT: no headache, no vision changes  Cardiac: no chest pain, no palpitations  Respiratory: no cough, no shortness of breath  Gastrointestinal: (+) abdominal pain, (+) nausea, (+) vomiting, no diarrhea, + melena, no hematochezia   Genitourinary: no hematuria, (+) dysuria, no urinary frequency, no urinary hesitancy,  (+) black stool  -Gisella Soriano MD Attending Physician

## 2023-12-06 NOTE — ED PROVIDER NOTE - OBJECTIVE STATEMENT
ED : Emiliano, 69 y/o female with PMHx of HLD presents to the ED c/o right lower back pain since this morning associated with subjective fevers, N/V, and dysuria. Pt also endorsing black stool for a couple days. Recently diagnosed with right kidney stone with failed laser lithotripsy. Pt is scheduled for a second procedure in March. Pt admits to one month of epigastric abdominal pain radiating to back. Pt is following up with here PCP for this complaint and is scheduled for an endoscopy tomorrow. No medication PTA. ED : Emiliano, 67 y/o female with PMHx of HLD presents to the ED c/o right lower back pain since this morning associated with subjective fevers, N/V, and dysuria. Pt also endorsing black stool for a couple days. Recently diagnosed with right kidney stone with failed laser lithotripsy. Pt is scheduled for a second procedure in March. Pt admits to one month of epigastric abdominal pain radiating to back. Pt is following up with here PCP for this complaint and is scheduled for an endoscopy tomorrow. No medication PTA.

## 2023-12-06 NOTE — ED PROVIDER NOTE - PHYSICAL EXAMINATION
PHYSICAL EXAM:   General: well-appearing, appears stated age, not in extremis   HEENT: NC/AT, PERRLA, conjunctiva pink, airway patent  Cardiovascular: regular rate and rhythm, + S1/S2, no murmurs, rubs, gallops appreciated  Respiratory: clear to auscultation bilaterally, good aeration bilaterally, nonlabored respirations  Abdominal: soft, right flank tenderness, nondistended, no rebound, guarding or rigidity  Back: right costovertebral tenderness, no rashes noted  Extremities: no LE edema b/l. Radial pulses equal and strong b/l  Neuro: Alert and oriented x3. Moving all extremities. Ambulatory.  Psychiatric: appropriate mood and affect.   Skin: appropriate color, warm, dry.   Rectal: Chaperone: Dulce. No stool on digital rectal exam. No gross blood.   -Gisella Soriano MD Attending Physician PHYSICAL EXAM:   General: appears uncomfortable,  HEENT: NC/AT, airway patent  Cardiovascular: regular rate and rhythm, + S1/S2, no murmurs, rubs, gallops appreciated  Respiratory: clear to auscultation bilaterally, good aeration bilaterally, nonlabored respirations  Abdominal: soft, right flank tenderness, nondistended, no rebound, guarding or rigidity  Back: right costovertebral tenderness, no dermatomal rashes  Psychiatric: appropriate mood and affect.   Skin: chronic blanching erythematous rash diffuse to back  Rectal: Chaperone: Dulce. No stool on digital rectal exam. No gross blood.   -Gisella Soriano MD Attending Physician

## 2023-12-07 ENCOUNTER — TRANSCRIPTION ENCOUNTER (OUTPATIENT)
Age: 68
End: 2023-12-07

## 2023-12-07 VITALS
HEART RATE: 80 BPM | SYSTOLIC BLOOD PRESSURE: 131 MMHG | TEMPERATURE: 99 F | RESPIRATION RATE: 18 BRPM | DIASTOLIC BLOOD PRESSURE: 72 MMHG | OXYGEN SATURATION: 94 %

## 2023-12-07 LAB
ANION GAP SERPL CALC-SCNC: 10 MMOL/L — SIGNIFICANT CHANGE UP (ref 5–17)
BUN SERPL-MCNC: 15.1 MG/DL — SIGNIFICANT CHANGE UP (ref 8–20)
CALCIUM SERPL-MCNC: 9 MG/DL — SIGNIFICANT CHANGE UP (ref 8.4–10.5)
CHLORIDE SERPL-SCNC: 108 MMOL/L — SIGNIFICANT CHANGE UP (ref 96–108)
CO2 SERPL-SCNC: 24 MMOL/L — SIGNIFICANT CHANGE UP (ref 22–29)
CREAT SERPL-MCNC: 0.55 MG/DL — SIGNIFICANT CHANGE UP (ref 0.5–1.3)
EGFR: 100 ML/MIN/1.73M2 — SIGNIFICANT CHANGE UP
GLUCOSE SERPL-MCNC: 88 MG/DL — SIGNIFICANT CHANGE UP (ref 70–99)
HCT VFR BLD CALC: 39.2 % — SIGNIFICANT CHANGE UP (ref 34.5–45)
HCT VFR BLD CALC: 40 % — SIGNIFICANT CHANGE UP (ref 34.5–45)
HCV AB S/CO SERPL IA: 0.05 S/CO — SIGNIFICANT CHANGE UP (ref 0–0.99)
HCV AB SERPL-IMP: SIGNIFICANT CHANGE UP
HGB BLD-MCNC: 12.8 G/DL — SIGNIFICANT CHANGE UP (ref 11.5–15.5)
HGB BLD-MCNC: 13.1 G/DL — SIGNIFICANT CHANGE UP (ref 11.5–15.5)
LACTATE SERPL-SCNC: 2.2 MMOL/L — HIGH (ref 0.5–2)
LACTATE SERPL-SCNC: 2.7 MMOL/L — HIGH (ref 0.5–2)
MAGNESIUM SERPL-MCNC: 2.1 MG/DL — SIGNIFICANT CHANGE UP (ref 1.6–2.6)
MCHC RBC-ENTMCNC: 30.3 PG — SIGNIFICANT CHANGE UP (ref 27–34)
MCHC RBC-ENTMCNC: 30.8 PG — SIGNIFICANT CHANGE UP (ref 27–34)
MCHC RBC-ENTMCNC: 32.7 GM/DL — SIGNIFICANT CHANGE UP (ref 32–36)
MCHC RBC-ENTMCNC: 32.8 GM/DL — SIGNIFICANT CHANGE UP (ref 32–36)
MCV RBC AUTO: 92.7 FL — SIGNIFICANT CHANGE UP (ref 80–100)
MCV RBC AUTO: 94.1 FL — SIGNIFICANT CHANGE UP (ref 80–100)
PHOSPHATE SERPL-MCNC: 3.4 MG/DL — SIGNIFICANT CHANGE UP (ref 2.4–4.7)
PLATELET # BLD AUTO: 224 K/UL — SIGNIFICANT CHANGE UP (ref 150–400)
PLATELET # BLD AUTO: 267 K/UL — SIGNIFICANT CHANGE UP (ref 150–400)
POTASSIUM SERPL-MCNC: 4.5 MMOL/L — SIGNIFICANT CHANGE UP (ref 3.5–5.3)
POTASSIUM SERPL-SCNC: 4.5 MMOL/L — SIGNIFICANT CHANGE UP (ref 3.5–5.3)
RBC # BLD: 4.23 M/UL — SIGNIFICANT CHANGE UP (ref 3.8–5.2)
RBC # BLD: 4.25 M/UL — SIGNIFICANT CHANGE UP (ref 3.8–5.2)
RBC # FLD: 12.5 % — SIGNIFICANT CHANGE UP (ref 10.3–14.5)
SODIUM SERPL-SCNC: 142 MMOL/L — SIGNIFICANT CHANGE UP (ref 135–145)
WBC # BLD: 11.14 K/UL — HIGH (ref 3.8–10.5)
WBC # BLD: 12.29 K/UL — HIGH (ref 3.8–10.5)
WBC # FLD AUTO: 11.14 K/UL — HIGH (ref 3.8–10.5)
WBC # FLD AUTO: 12.29 K/UL — HIGH (ref 3.8–10.5)

## 2023-12-07 PROCEDURE — 99233 SBSQ HOSP IP/OBS HIGH 50: CPT

## 2023-12-07 PROCEDURE — 83690 ASSAY OF LIPASE: CPT

## 2023-12-07 PROCEDURE — 84484 ASSAY OF TROPONIN QUANT: CPT

## 2023-12-07 PROCEDURE — 87086 URINE CULTURE/COLONY COUNT: CPT

## 2023-12-07 PROCEDURE — 74177 CT ABD & PELVIS W/CONTRAST: CPT | Mod: MA

## 2023-12-07 PROCEDURE — 99285 EMERGENCY DEPT VISIT HI MDM: CPT | Mod: 25

## 2023-12-07 PROCEDURE — 80053 COMPREHEN METABOLIC PANEL: CPT

## 2023-12-07 PROCEDURE — 93005 ELECTROCARDIOGRAM TRACING: CPT

## 2023-12-07 PROCEDURE — 36415 COLL VENOUS BLD VENIPUNCTURE: CPT

## 2023-12-07 PROCEDURE — 85025 COMPLETE CBC W/AUTO DIFF WBC: CPT

## 2023-12-07 PROCEDURE — 96375 TX/PRO/DX INJ NEW DRUG ADDON: CPT

## 2023-12-07 PROCEDURE — 86803 HEPATITIS C AB TEST: CPT

## 2023-12-07 PROCEDURE — 85027 COMPLETE CBC AUTOMATED: CPT

## 2023-12-07 PROCEDURE — 83735 ASSAY OF MAGNESIUM: CPT

## 2023-12-07 PROCEDURE — 87040 BLOOD CULTURE FOR BACTERIA: CPT

## 2023-12-07 PROCEDURE — 80048 BASIC METABOLIC PNL TOTAL CA: CPT

## 2023-12-07 PROCEDURE — 84100 ASSAY OF PHOSPHORUS: CPT

## 2023-12-07 PROCEDURE — 96374 THER/PROPH/DIAG INJ IV PUSH: CPT

## 2023-12-07 PROCEDURE — 81001 URINALYSIS AUTO W/SCOPE: CPT

## 2023-12-07 PROCEDURE — 83605 ASSAY OF LACTIC ACID: CPT

## 2023-12-07 PROCEDURE — 82272 OCCULT BLD FECES 1-3 TESTS: CPT

## 2023-12-07 RX ORDER — CEFDINIR 250 MG/5ML
1 POWDER, FOR SUSPENSION ORAL
Qty: 14 | Refills: 0
Start: 2023-12-07 | End: 2023-12-13

## 2023-12-07 RX ORDER — TAMSULOSIN HYDROCHLORIDE 0.4 MG/1
0.4 CAPSULE ORAL ONCE
Refills: 0 | Status: COMPLETED | OUTPATIENT
Start: 2023-12-07 | End: 2023-12-07

## 2023-12-07 RX ORDER — KETOROLAC TROMETHAMINE 30 MG/ML
15 SYRINGE (ML) INJECTION ONCE
Refills: 0 | Status: DISCONTINUED | OUTPATIENT
Start: 2023-12-07 | End: 2023-12-07

## 2023-12-07 RX ORDER — PANTOPRAZOLE SODIUM 20 MG/1
40 TABLET, DELAYED RELEASE ORAL DAILY
Refills: 0 | Status: DISCONTINUED | OUTPATIENT
Start: 2023-12-07 | End: 2023-12-07

## 2023-12-07 RX ORDER — PANTOPRAZOLE SODIUM 20 MG/1
40 TABLET, DELAYED RELEASE ORAL ONCE
Refills: 0 | Status: COMPLETED | OUTPATIENT
Start: 2023-12-07 | End: 2023-12-07

## 2023-12-07 RX ORDER — ACETAMINOPHEN 500 MG
1000 TABLET ORAL ONCE
Refills: 0 | Status: COMPLETED | OUTPATIENT
Start: 2023-12-07 | End: 2023-12-07

## 2023-12-07 RX ORDER — TAMSULOSIN HYDROCHLORIDE 0.4 MG/1
1 CAPSULE ORAL
Qty: 0 | Refills: 0
Start: 2023-12-07

## 2023-12-07 RX ORDER — TAMSULOSIN HYDROCHLORIDE 0.4 MG/1
1 CAPSULE ORAL
Qty: 14 | Refills: 0
Start: 2023-12-07 | End: 2023-12-20

## 2023-12-07 RX ORDER — SODIUM CHLORIDE 9 MG/ML
1000 INJECTION INTRAMUSCULAR; INTRAVENOUS; SUBCUTANEOUS
Refills: 0 | Status: DISCONTINUED | OUTPATIENT
Start: 2023-12-07 | End: 2023-12-07

## 2023-12-07 RX ORDER — TAMSULOSIN HYDROCHLORIDE 0.4 MG/1
0.4 CAPSULE ORAL AT BEDTIME
Refills: 0 | Status: DISCONTINUED | OUTPATIENT
Start: 2023-12-07 | End: 2023-12-07

## 2023-12-07 RX ORDER — SODIUM CHLORIDE 9 MG/ML
1000 INJECTION INTRAMUSCULAR; INTRAVENOUS; SUBCUTANEOUS ONCE
Refills: 0 | Status: COMPLETED | OUTPATIENT
Start: 2023-12-07 | End: 2023-12-07

## 2023-12-07 RX ADMIN — SODIUM CHLORIDE 1000 MILLILITER(S): 9 INJECTION INTRAMUSCULAR; INTRAVENOUS; SUBCUTANEOUS at 06:32

## 2023-12-07 RX ADMIN — SODIUM CHLORIDE 75 MILLILITER(S): 9 INJECTION INTRAMUSCULAR; INTRAVENOUS; SUBCUTANEOUS at 01:03

## 2023-12-07 RX ADMIN — TAMSULOSIN HYDROCHLORIDE 0.4 MILLIGRAM(S): 0.4 CAPSULE ORAL at 01:03

## 2023-12-07 RX ADMIN — SODIUM CHLORIDE 75 MILLILITER(S): 9 INJECTION INTRAMUSCULAR; INTRAVENOUS; SUBCUTANEOUS at 09:15

## 2023-12-07 RX ADMIN — Medication 15 MILLIGRAM(S): at 06:31

## 2023-12-07 RX ADMIN — PANTOPRAZOLE SODIUM 40 MILLIGRAM(S): 20 TABLET, DELAYED RELEASE ORAL at 01:03

## 2023-12-07 RX ADMIN — PANTOPRAZOLE SODIUM 40 MILLIGRAM(S): 20 TABLET, DELAYED RELEASE ORAL at 08:24

## 2023-12-07 RX ADMIN — Medication 400 MILLIGRAM(S): at 08:24

## 2023-12-07 RX ADMIN — Medication 1000 MILLIGRAM(S): at 09:30

## 2023-12-07 NOTE — DISCHARGE NOTE NURSING/CASE MANAGEMENT/SOCIAL WORK - FLU SEASON?
Patient Education     Urinary Tract Infections in Women    Urinary tract infections (UTIs) are most often caused byÂ bacteria. These bacteria enter the urinary tract. The bacteria may come from outside the body. Or they may travel from the skin outside theÂ rectum or vagina into the urethra. Female anatomy makes it easy for bacteria from the bowelÂ to enter a womanâs urinary tract, which is the most common source of UTI. This means women develop UTIs more often than men. Pain in or around the urinary tract is a common UTI symptom. But the only way to know for sure if you have a UTI for the healthcare provider to test your urine. The two tests that may be done are the urinalysis and urine culture. Types of UTIs  Â· Cystitis. A bladder infection (cystitis) is the most common UTI in women. You may have urgent or frequent urination. You may also haveÂ pain, burning when you urinate, and bloody urine. Â· Urethritis. This is an inflamed urethra, which is the tube that carries urine from the bladder to outside the body. You may have lower stomach or back pain. You may also have urgent or frequent urination. Â· Pyelonephritis. This is a kidney infection. If not treated, it can be serious and damage your kidneys. In severe cases, you may need to stay in the hospital. You may have a fever and lower back pain. Medicines to treat a UTI  Most UTIs are treated with antibiotics. These kill the bacteria. The length of time you need to take them depends on the type of infection. It may be as short as 3 days. If you have repeated UTIs, you may need a low-dose antibioticÂ for several months. Take antibiotics exactly as directed. Donât stop taking them until all of the medicine is gone. If you stop taking the antibiotic too soon, the infection may not go away. You may also develop a resistance to the antibiotic. This can make it much harder to treat.   Lifestyle changes to treat and prevent UTIs  The lifestyle changes below will help get rid of your UTI. They may also help prevent future UTIs. Â· Drink plenty of fluids. This includes water, juice, or other caffeine-free drinks. Fluids help flush bacteria out of your body. Â· Empty your bladder. Always empty your bladder when you feel the urge to urinate. And always urinate before going to sleep. Urine that stays in your bladder can lead to infection. Try to urinate before and after sex as well. Â· Practice good personal hygiene. Wipe yourself from front to back after using the toilet. This helps keep bacteria from getting into the urethra. Â· Use condoms during sex. These help prevent UTIs caused by sexually transmitted bacteria. Also don't use spermicides during sex. These can increase the risk for UTIs. Choose other forms of birth control instead. For women who tend to get UTIs after sex, a low-dose of a preventive antibiotic may be used. Be sure to discuss this option with your healthcare provider. Â· Follow up with your healthcare provider as directed. He or she may test to make sure the infection has cleared. If needed, more treatment may be started. Date Last Reviewed: 1/1/2017  Â© 4971-0198 [employer] [place]. 2016 [facility], [facility] [address] All rights reserved. This information is not intended as a substitute for professional medical care. Always follow your healthcare professional's instructions. Yes...

## 2023-12-07 NOTE — PROGRESS NOTE ADULT - SUBJECTIVE AND OBJECTIVE BOX
Urology f/u:    Patient seen and examined at bedside this am - with  at bedside.   Patient awake, alert, responsive in no acute distress- says she had an appointment today with her GI Doctor in [place] for evaluation stomach problem.      patient denies any headache, dizziness, nausea, vomiting no palpitations and no voiding difficulty.     Vital Signs Last 24 Hrs  T(C): 36.7 (07 Dec 2023 11:47), Max: 37.7 (06 Dec 2023 16:53)  T(F): 98 (07 Dec 2023 11:47), Max: 99.8 (06 Dec 2023 16:53)  HR: 74 (07 Dec 2023 11:47) (74 - 103)  BP: 120/74 (07 Dec 2023 11:47) (98/56 - 120/78)  BP(mean): --  RR: 17 (07 Dec 2023 11:47) (15 - 17)  SpO2: 99% (07 Dec 2023 11:47) (95% - 99%)    Parameters below as of 07 Dec 2023 11:47  Patient On (Oxygen Delivery Method): room air    Back/Flank:  denies any flank pain or tenderness on palpation bilateral.   :  voids adequately  I&O's Detail- no report output.   Patient was instructed to strain her urine in case passes the stone .  2 MM stone UVJ.      labs:                        12.8   12.29 )-----------( 267      ( 07 Dec 2023 06:55 )             39.2     Chemistry:      142  |  108  |  15.1  ----------------------------<  88  4.5   |  24.0  |  0.55        repeat lactate:  continues down trend  2.2/ 2.7/ 2.8/3.0       Urology f/u:    Patient seen and examined at bedside this am - with  at bedside.   Patient awake, alert, responsive in no acute distress- says she had an appointment today with her GI Doctor in [place] for evaluation stomach problem.      patient denies any headache, dizziness, nausea, vomiting no palpitations and no voiding difficulty.     Vital Signs Last 24 Hrs  T(C): 36.7 (07 Dec 2023 11:47), Max: 37.7 (06 Dec 2023 16:53)  T(F): 98 (07 Dec 2023 11:47), Max: 99.8 (06 Dec 2023 16:53)  HR: 74 (07 Dec 2023 11:47) (74 - 103)  BP: 120/74 (07 Dec 2023 11:47) (98/56 - 120/78)  BP(mean): --  RR: 17 (07 Dec 2023 11:47) (15 - 17)  SpO2: 99% (07 Dec 2023 11:47) (95% - 99%)    Parameters below as of 07 Dec 2023 11:47  Patient On (Oxygen Delivery Method): room air    Back/Flank:  denies any flank pain or tenderness on palpation bilateral.   :  voids adequately  I&O's Detail- no report output.   Patient was instructed to strain her urine in case passes the stone .  2 MM stone UVJ.      labs:                        12.8   12.29 )-----------( 267      ( 07 Dec 2023 06:55 )             39.2     Chemistry:      142  |  108  |  15.1  ----------------------------<  88  4.5   |  24.0  |  0.55        repeat lactate:  continues down trend  2.2/ 2.7/ 2.8/3.0       Urology f/u:    Patient seen and examined at bedside this am - with  at bedside.   Patient awake, alert, responsive in no acute distress- says she had an appointment today with her GI Doctor in [place] for evaluation stomach problem.      patient denies any headache, dizziness, nausea, vomiting no palpitations and no voiding difficulty.     Vital Signs Last 24 Hrs  T(C): 36.7 (07 Dec 2023 11:47), Max: 37.7 (06 Dec 2023 16:53)  T(F): 98 (07 Dec 2023 11:47), Max: 99.8 (06 Dec 2023 16:53)  HR: 74 (07 Dec 2023 11:47) (74 - 103)  BP: 120/74 (07 Dec 2023 11:47) (98/56 - 120/78)  BP(mean): --  RR: 17 (07 Dec 2023 11:47) (15 - 17)  SpO2: 99% (07 Dec 2023 11:47) (95% - 99%)    Parameters below as of 07 Dec 2023 11:47  Patient On (Oxygen Delivery Method): room air    Back/Flank:  denies any flank pain or tenderness on palpation bilateral.   :  voids adequately  I&O's Detail- no report output.   Patient was instructed to strain her urine in case passes the stone .  2 MM stone UVJ.      labs:                        12.8   12.29 )-----------( 267      ( 07 Dec 2023 06:55 )             39.2     Chemistry:      142  |  108  |  15.1  ----------------------------<  88  4.5   |  24.0  |  0.55        repeat lactate:  continues down trend  2.2/ 2.7/ 2.8/3.0      Impression:  stable slowly improving : Lactate improved, WBC trending down , T. max. (( on admission.  CT scan with  right 2 mm ureteral stone at the UVJ with hydroureteronephrosis.   Pain managed with tylenol, encouraged to increase liquids.   Patient tolerating PO liquids and states is hungry- bland diet started.  Plan:  Discussed with patient and Dr. Malik - follow on diet and patient wishes to go home , will discharge patient with PO antibiotics and continue OTC pain medication for pain..  Also explained to patient to continue to strain her urine as stone is at edge of bladder can possible pass any time.      Addendum : Dr. Malik discussed with patient in Czech fully present situation and will discharge her home and to continue fluids, antibiotics  and straining her urine and that Dr. Wood' office will call to schedule follow up in office .  And if condition changes and you get worse or are concerned return to the ER.       Urology f/u:    Patient seen and examined at bedside this am - with  at bedside.   Patient awake, alert, responsive in no acute distress- says she had an appointment today with her GI Doctor in [place] for evaluation stomach problem.      patient denies any headache, dizziness, nausea, vomiting no palpitations and no voiding difficulty.     Vital Signs Last 24 Hrs  T(C): 36.7 (07 Dec 2023 11:47), Max: 37.7 (06 Dec 2023 16:53)  T(F): 98 (07 Dec 2023 11:47), Max: 99.8 (06 Dec 2023 16:53)  HR: 74 (07 Dec 2023 11:47) (74 - 103)  BP: 120/74 (07 Dec 2023 11:47) (98/56 - 120/78)  BP(mean): --  RR: 17 (07 Dec 2023 11:47) (15 - 17)  SpO2: 99% (07 Dec 2023 11:47) (95% - 99%)    Parameters below as of 07 Dec 2023 11:47  Patient On (Oxygen Delivery Method): room air    Back/Flank:  denies any flank pain or tenderness on palpation bilateral.   :  voids adequately  I&O's Detail- no report output.   Patient was instructed to strain her urine in case passes the stone .  2 MM stone UVJ.      labs:                        12.8   12.29 )-----------( 267      ( 07 Dec 2023 06:55 )             39.2     Chemistry:      142  |  108  |  15.1  ----------------------------<  88  4.5   |  24.0  |  0.55        repeat lactate:  continues down trend  2.2/ 2.7/ 2.8/3.0      Impression:  stable slowly improving : Lactate improved, WBC trending down , T. max. (( on admission.  CT scan with  right 2 mm ureteral stone at the UVJ with hydroureteronephrosis.   Pain managed with tylenol, encouraged to increase liquids.   Patient tolerating PO liquids and states is hungry- bland diet started.  Plan:  Discussed with patient and Dr. Mlaik - follow on diet and patient wishes to go home , will discharge patient with PO antibiotics and continue OTC pain medication for pain..  Also explained to patient to continue to strain her urine as stone is at edge of bladder can possible pass any time.      Addendum : Dr. Malik discussed with patient in Hungarian fully present situation and will discharge her home and to continue fluids, antibiotics  and straining her urine and that Dr. Wood' office will call to schedule follow up in office .  And if condition changes and you get worse or are concerned return to the ER.

## 2023-12-07 NOTE — DISCHARGE NOTE PROVIDER - HOSPITAL COURSE
67 y/o female with PMHx of HLD presents to the ED c/o right lower back pain since this morning associated with subjective fevers, N/V, and dysuria. Pt also endorsing black stool for a couple days. Recently diagnosed with right kidney stone with failed laser lithotripsy. Pt is scheduled for a second procedure in March. Pt admits to one month of epigastric abdominal pain radiating to back. Pt is following up with here PCP for this complaint and is scheduled for a GI specialist evaluation in his office  she states for endoscopy tomorrow.  No medication PTA.   Patient admitted started with IVFs with and a bolus given , started on antibiotics and pain management with routine follow up labs ordered.   Patient seen today hospital day 2 and seen with Urologist Dr. Malik.  Patient improved , remained afebrile, labs improved , tolerated PO dietCT scan reviewed with distal 2 mm UVJ stone with possiblity to pass on its own.  Dr. Malik discussed fully with patient present situation and need to continue to strain her urine, encouraged to continue increased PO fluids and will continue on PO antibiotics and can go home and Surgeons office will contact her in few days  for follow up appointment in office in about 5-10 days.   If any change in condition is to return to ER for re-evaluation and management.

## 2023-12-07 NOTE — H&P ADULT - ASSESSMENT
Patient is a 67 y/o F with PMH of kidney stones and HLD . She presents today with ED c/o right lower back pain since this morning associated with subjective fevers, N/V, and dysuria. To note patient has known right kidney stones that failed lithotripsy and has a scheduled procedure in march for further intervention. Workup was significant for a leukocytosis of 18, lactate 3 (slowly resolving) and ct scan Duplicated right renal collecting system with delayed nephrogram of the lower pole and moderate hydroureteronephrosis of the lower pole secondary to a 2 mm calculus at the ureterovesical junction. Asymmetric right-sided perinephric stranding and urothelial enhancement. I discussed and reviewed all findings with Dr. Malik and we plan on conservative management for now 2/2 UA negative, patient being clinically well and hemodynamically normal, pain is under control, nausea and vomiting have ceased. We will admit patient overnight for observation and if patient does well overnight and no change in hemodynamics or change in clinically status she can be discharged home and f/u as an outpatient.   Patient is a 69 y/o F with PMH of kidney stones and HLD . She presents today with ED c/o right lower back pain since this morning associated with subjective fevers, N/V, and dysuria. To note patient has known right kidney stones that failed lithotripsy and has a scheduled procedure in march for further intervention. Workup was significant for a leukocytosis of 18, lactate 3 (slowly resolving) and ct scan Duplicated right renal collecting system with delayed nephrogram of the lower pole and moderate hydroureteronephrosis of the lower pole secondary to a 2 mm calculus at the ureterovesical junction. Asymmetric right-sided perinephric stranding and urothelial enhancement. I discussed and reviewed all findings with Dr. Malik and we plan on conservative management for now 2/2 UA negative, patient being clinically well and hemodynamically normal, pain is under control, nausea and vomiting have ceased. We will admit patient overnight for observation and if patient does well overnight and no change in hemodynamics or change in clinically status she can be discharged home and f/u as an outpatient.

## 2023-12-07 NOTE — DISCHARGE NOTE NURSING/CASE MANAGEMENT/SOCIAL WORK - NSDCPEFALRISK_GEN_ALL_CORE
For information on Fall & Injury Prevention, visit: [email or web address]/[email or web address] OR  [email or web address]/[email or web address]-[email or web address]-[email or web address]-injury OR  https://www.cdc.gov/steadi/patient.html For information on Fall & Injury Prevention, visit: [email or web address]/[email or web address]-[email or web address]-mobility OR  [email or web address]/[email or web address]-[email or web address]-to-avoid-injury OR  https://www.cdc.gov/steadi/patient.html

## 2023-12-07 NOTE — DISCHARGE NOTE PROVIDER - NSDCFUADDAPPT_GEN_ALL_CORE_FT
Dr. Wood' office will make contact with you to schedule follow up.. You are encouraged to continue to take extra PO flyuids, to strain your urine in case stone passes, and to use over the counter medication ( ie. tylenol, Ibuprofen etc ) for any pain .  You are also prescribed antibiotics and are to continue to take them as ordered for 7 days.  If you have any problems contact Dr. Wood' office.   If unable to make contact then return to ER for re -evaluation.

## 2023-12-07 NOTE — H&P ADULT - NSHPPHYSICALEXAM_GEN_ALL_CORE
Constitutional: Well-developed well nourished Female in no acute distress  Respiratory: Breath sounds CTA b/l respirations are unlabored, no accessory muscle use, no conversational dyspnea  Cardiovascular: Regular rate & rhythm, +S1, S1,   Gastrointestinal: Abdomen soft, non-tender, non-distended, no rebound tenderness / guarding,  : Voiding spontaneously   Neurological: A&O x 3;

## 2023-12-07 NOTE — PATIENT PROFILE ADULT - FALL HARM RISK - RISK INTERVENTIONS
Assistance OOB with selected safe patient handling equipment/Communicate Fall Risk and Risk Factors to all staff, patient, and family/Discuss with provider need for PT consult/Monitor gait and stability/Provide patient with walking aids - walker, cane, crutches/Reinforce activity limits and safety measures with patient and family/Visual Cue: Yellow wristband/Bed in lowest position, wheels locked, appropriate side rails in place/Call bell, personal items and telephone in reach/Instruct patient to call for assistance before getting out of bed or chair/Non-slip footwear when patient is out of bed/[place] to call system/Physically safe environment - no spills, clutter or unnecessary equipment/Purposeful Proactive Rounding/Room/bathroom lighting operational, light cord in reach Assistance OOB with selected safe patient handling equipment/Communicate Fall Risk and Risk Factors to all staff, patient, and family/Discuss with provider need for PT consult/Monitor gait and stability/Provide patient with walking aids - walker, cane, crutches/Reinforce activity limits and safety measures with patient and family/Visual Cue: Yellow wristband/Bed in lowest position, wheels locked, appropriate side rails in place/Call bell, personal items and telephone in reach/Instruct patient to call for assistance before getting out of bed or chair/Non-slip footwear when patient is out of bed/[place] to call system/Physically safe environment - no spills, clutter or unnecessary equipment/Purposeful Proactive Rounding/Room/bathroom lighting operational, light cord in reach

## 2023-12-07 NOTE — DISCHARGE NOTE NURSING/CASE MANAGEMENT/SOCIAL WORK - PATIENT PORTAL LINK FT
You can access the FollowMyHealth Patient Portal offered by [facility] by registering at the following website: [email or web address]. By joining [employer]’s FollowMyHealth portal, you will also be able to view your health information using other applications (apps) compatible with our system. You can access the FollowMyHealth Patient Portal offered by [facility] by registering at the following website: [email or web address]. By joining [employer]’s FollowMyHealth portal, you will also be able to view your health information using other applications (apps) compatible with our system.

## 2023-12-07 NOTE — H&P ADULT - HISTORY OF PRESENT ILLNESS
ED : Emiliano, 67 y/o female with PMHx of HLD presents to the ED c/o right lower back pain since this morning associated with subjective fevers, N/V, and dysuria. Pt also endorsing black stool for a couple days. Recently diagnosed with right kidney stone with failed laser lithotripsy. Pt is scheduled for a second procedure in March. Pt admits to one month of epigastric abdominal pain radiating to back. Pt is following up with here PCP for this complaint and is scheduled for an endoscopy tomorrow. No medication PTA ED : Emiliano, 69 y/o female with PMHx of HLD presents to the ED c/o right lower back pain since this morning associated with subjective fevers, N/V, and dysuria. Pt also endorsing black stool for a couple days. Recently diagnosed with right kidney stone with failed laser lithotripsy. Pt is scheduled for a second procedure in March. Pt admits to one month of epigastric abdominal pain radiating to back. Pt is following up with here PCP for this complaint and is scheduled for an endoscopy tomorrow. No medication PTA

## 2023-12-07 NOTE — H&P ADULT - NSTOBACCOSCREENHP_GEN_A_NCS
Goal Outcome Evaluation:  Plan of Care Reviewed With: patient        Progress: improving  Outcome Evaluation: A&Ox4 calm and cooperative. No c.o any pain. Continues with IV antibiotic. PICC line to PETE. Uses O2 at 1.5L at HS via NC, she desats on room air when asleep. Therapy weaned pt down to 1L O2. No unsafe behavior.          No

## 2023-12-07 NOTE — DISCHARGE NOTE PROVIDER - CARE PROVIDER_API CALL
Todd Malik  Urology  [address], [address]  [place] [address]  Phone: [phone]  Fax: [phone]  Follow Up Time:    Todd Malik  Urology  [address], [address]  [place] [address]  Phone: [phone]  Fax: [phone]  Follow Up Time:

## 2023-12-07 NOTE — H&P ADULT - NS ATTEND AMEND GEN_ALL_CORE FT
Agree w above. UA negative. No indication for acute intervention. Stone will likely pass spontaneously.

## 2023-12-08 LAB
CULTURE RESULTS: SIGNIFICANT CHANGE UP
SPECIMEN SOURCE: SIGNIFICANT CHANGE UP

## 2023-12-11 LAB

## 2023-12-27 ENCOUNTER — NON-APPOINTMENT (OUTPATIENT)
Age: 68
End: 2023-12-27

## 2023-12-27 ENCOUNTER — APPOINTMENT (OUTPATIENT)
Dept: CARDIOLOGY | Facility: CLINIC | Age: 68
End: 2023-12-27
Payer: MEDICARE

## 2023-12-27 VITALS
WEIGHT: 120 LBS | DIASTOLIC BLOOD PRESSURE: 75 MMHG | HEART RATE: 77 BPM | SYSTOLIC BLOOD PRESSURE: 116 MMHG | HEIGHT: 60 IN | RESPIRATION RATE: 14 BRPM | BODY MASS INDEX: 23.56 KG/M2

## 2023-12-27 DIAGNOSIS — I38 ENDOCARDITIS, VALVE UNSPECIFIED: ICD-10-CM

## 2023-12-27 DIAGNOSIS — N20.0 CALCULUS OF KIDNEY: ICD-10-CM

## 2023-12-27 DIAGNOSIS — R00.2 PALPITATIONS: ICD-10-CM

## 2023-12-27 PROCEDURE — 99214 OFFICE O/P EST MOD 30 MIN: CPT

## 2023-12-27 PROCEDURE — 93000 ELECTROCARDIOGRAM COMPLETE: CPT

## 2023-12-27 NOTE — DISCUSSION/SUMMARY
[FreeTextEntry1] : 1 - Hypertension:  blood pressure well controlled on current medications.  Advised to follow low sodium diet.  2 - Palpitations:  has not had further complaints of palpitations.  Continues to keep caffeine intake to a minimum, hydrates well and tries to get adequate sleep.  3 - Nephrolithiasis/UTI:  patient is being followed and treated by urology.  4 - Hyperlipidemia:  no recent labs,  Continue with Atorvastatin 40mg daily.  Follow low fat, low cholesterol diet.  Fasting labs prior to follow up visit.  5 - Follow up with Dr. Payan in 6 months. [EKG obtained to assist in diagnosis and management of assessed problem(s)] : EKG obtained to assist in diagnosis and management of assessed problem(s)

## 2023-12-27 NOTE — CARDIOLOGY SUMMARY
[de-identified] : Sinus rhythm at 77 bpm.  Poor R-wave progression V1-V3.  Non-specific ST-T changes.

## 2023-12-27 NOTE — PHYSICAL EXAM
[Well Developed] : well developed [Well Nourished] : well nourished [No Acute Distress] : no acute distress [Normal Conjunctiva] : normal conjunctiva [Normal Venous Pressure] : normal venous pressure [No Carotid Bruit] : no carotid bruit [Normal S1, S2] : normal S1, S2 [No Murmur] : no murmur [No Rub] : no rub [No Gallop] : no gallop [Clear Lung Fields] : clear lung fields [Normal Bowel Sounds] : normal bowel sounds [Normal Gait] : normal gait [No Edema] : no edema [No Rash] : no rash [Moves all extremities] : moves all extremities [No Focal Deficits] : no focal deficits [Normal Speech] : normal speech [Alert and Oriented] : alert and oriented [Normal memory] : normal memory

## 2023-12-27 NOTE — HISTORY OF PRESENT ILLNESS
[FreeTextEntry1] : Mrs. Mendoza presents today without complaints of exertional chest pain, shortness of breath, palpitations, lightheadedness or syncope.  Her major complaint at this time is that of nephrolithiasis and UTI for which she is being managed and treated by urology.

## 2024-01-26 ENCOUNTER — LABORATORY RESULT (OUTPATIENT)
Age: 69
End: 2024-01-26

## 2024-01-26 ENCOUNTER — APPOINTMENT (OUTPATIENT)
Dept: UROLOGY | Facility: CLINIC | Age: 69
End: 2024-01-26
Payer: MEDICARE

## 2024-01-26 VITALS
BODY MASS INDEX: 24.15 KG/M2 | DIASTOLIC BLOOD PRESSURE: 83 MMHG | HEART RATE: 98 BPM | SYSTOLIC BLOOD PRESSURE: 133 MMHG | HEIGHT: 60 IN | WEIGHT: 123 LBS

## 2024-01-26 PROCEDURE — 99214 OFFICE O/P EST MOD 30 MIN: CPT

## 2024-01-26 PROCEDURE — 99204 OFFICE O/P NEW MOD 45 MIN: CPT

## 2024-01-26 NOTE — ASSESSMENT
[FreeTextEntry1] : 68F w HTN and HLD presents for nephrolithiasis. She had a 12/6/23 CT that showed duplicated R collecting system, 6mm R lower pole stone, and 2mm R UVJ stone w mild HUN of the lower pole collecting system. The UVJ stone has likely passed, but we will obtain repeat imaging. I do not think that her nephrolithiasis and back pain are related, and I encouraged her to perform daily exercise and stretching.  -UA/UCx today -CT renal colic (prone) -Daily exercise/stretching -RTC 3 weeks

## 2024-01-26 NOTE — HISTORY OF PRESENT ILLNESS
[FreeTextEntry1] : 68F w HTN and HLD presents for nephrolithiasis.  12/6/23, CT showed duplicated R collecting system, 6mm R lower pole stone, and 2mm R UVJ stone w mild HUN of the lower pole collecting system. BMP WNL. WBC 17.8. UA showed 10 RBCs, 2 WBCs, and no bacteria. UCx negative. She was discharged w ABx.  Today, she reports that she has intermittent LEFT flank pain and gross hematuria. She does not do any regular exercise or stretching.

## 2024-01-27 LAB
APPEARANCE: CLEAR
BILIRUBIN URINE: NEGATIVE
BLOOD URINE: ABNORMAL
COLOR: YELLOW
GLUCOSE QUALITATIVE U: NEGATIVE MG/DL
KETONES URINE: NEGATIVE MG/DL
LEUKOCYTE ESTERASE URINE: NEGATIVE
NITRITE URINE: NEGATIVE
PH URINE: 5.5
PROTEIN URINE: NEGATIVE MG/DL
SPECIFIC GRAVITY URINE: 1.02
UROBILINOGEN URINE: 0.2 MG/DL

## 2024-01-29 PROBLEM — E78.5 HYPERLIPIDEMIA, UNSPECIFIED: Chronic | Status: ACTIVE | Noted: 2023-12-07

## 2024-02-05 ENCOUNTER — APPOINTMENT (OUTPATIENT)
Dept: CT IMAGING | Facility: CLINIC | Age: 69
End: 2024-02-05

## 2024-02-05 ENCOUNTER — OUTPATIENT (OUTPATIENT)
Dept: OUTPATIENT SERVICES | Facility: HOSPITAL | Age: 69
LOS: 1 days | End: 2024-02-05
Payer: MEDICARE

## 2024-02-05 DIAGNOSIS — N20.0 CALCULUS OF KIDNEY: ICD-10-CM

## 2024-02-05 PROCEDURE — 74176 CT ABD & PELVIS W/O CONTRAST: CPT

## 2024-02-16 ENCOUNTER — APPOINTMENT (OUTPATIENT)
Dept: UROLOGY | Facility: CLINIC | Age: 69
End: 2024-02-16
Payer: MEDICARE

## 2024-02-16 PROCEDURE — 99214 OFFICE O/P EST MOD 30 MIN: CPT

## 2024-02-16 NOTE — HISTORY OF PRESENT ILLNESS
[FreeTextEntry1] : 68F w HTN and HLD presents for nephrolithiasis.  12/6/23, CT showed duplicated R collecting system, 6mm R lower pole stone, and 2mm R UVJ stone w mild HUN of the lower pole collecting system. BMP WNL. WBC 17.8. UA showed 10 RBCs, 2 WBCs, and no bacteria. UCx negative. She was discharged w ABx.  2/5/24, CT showed the R UVJ stone passed. 6mm R lower pole stone is unchanged. There was also noted possible breast abnormality that would warrant mammogram f/u.  Today, she reports that she has intermittent LEFT flank pain. She does not do any regular exercise or stretching.

## 2024-02-16 NOTE — ASSESSMENT
[FreeTextEntry1] : 68F w HTN and HLD presents for nephrolithiasis. 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 No obstructing stones. We discussed management options and agreed to continue surveillance on the stone. She did have an abnormality of the breast seen on CT that warrants f/u w mammogram.  -MAHESH 8/2024 -PCP to perform mammogram -RTC 8/2024

## 2024-06-26 ENCOUNTER — APPOINTMENT (OUTPATIENT)
Dept: CARDIOLOGY | Facility: CLINIC | Age: 69
End: 2024-06-26
Payer: MEDICARE

## 2024-06-26 VITALS
SYSTOLIC BLOOD PRESSURE: 120 MMHG | DIASTOLIC BLOOD PRESSURE: 80 MMHG | BODY MASS INDEX: 22.78 KG/M2 | HEART RATE: 91 BPM | RESPIRATION RATE: 14 BRPM | WEIGHT: 116 LBS | HEIGHT: 60 IN

## 2024-06-26 DIAGNOSIS — K92.1 MELENA: ICD-10-CM

## 2024-06-26 DIAGNOSIS — I10 ESSENTIAL (PRIMARY) HYPERTENSION: ICD-10-CM

## 2024-06-26 DIAGNOSIS — E78.00 PURE HYPERCHOLESTEROLEMIA, UNSPECIFIED: ICD-10-CM

## 2024-06-26 DIAGNOSIS — E55.9 VITAMIN D DEFICIENCY, UNSPECIFIED: ICD-10-CM

## 2024-06-26 PROCEDURE — 93000 ELECTROCARDIOGRAM COMPLETE: CPT

## 2024-06-26 PROCEDURE — G2211 COMPLEX E/M VISIT ADD ON: CPT

## 2024-06-26 PROCEDURE — 99214 OFFICE O/P EST MOD 30 MIN: CPT

## 2024-06-26 RX ORDER — VITAMIN K2 90 MCG
125 MCG CAPSULE ORAL
Qty: 90 | Refills: 1 | Status: ACTIVE | COMMUNITY
Start: 2024-06-26 | End: 1900-01-01

## 2024-06-27 PROBLEM — K92.1 HEMATOCHEZIA: Status: ACTIVE | Noted: 2024-06-27

## 2024-08-09 ENCOUNTER — APPOINTMENT (OUTPATIENT)
Dept: UROLOGY | Facility: CLINIC | Age: 69
End: 2024-08-09

## 2024-12-18 ENCOUNTER — NON-APPOINTMENT (OUTPATIENT)
Age: 69
End: 2024-12-18

## 2024-12-18 ENCOUNTER — APPOINTMENT (OUTPATIENT)
Dept: CARDIOLOGY | Facility: CLINIC | Age: 69
End: 2024-12-18
Payer: COMMERCIAL

## 2024-12-18 VITALS
WEIGHT: 110 LBS | DIASTOLIC BLOOD PRESSURE: 100 MMHG | HEART RATE: 86 BPM | HEIGHT: 60 IN | BODY MASS INDEX: 21.6 KG/M2 | RESPIRATION RATE: 14 BRPM | SYSTOLIC BLOOD PRESSURE: 140 MMHG

## 2024-12-18 DIAGNOSIS — F41.9 ANXIETY DISORDER, UNSPECIFIED: ICD-10-CM

## 2024-12-18 DIAGNOSIS — R00.2 PALPITATIONS: ICD-10-CM

## 2024-12-18 DIAGNOSIS — I10 ESSENTIAL (PRIMARY) HYPERTENSION: ICD-10-CM

## 2024-12-18 DIAGNOSIS — E78.00 PURE HYPERCHOLESTEROLEMIA, UNSPECIFIED: ICD-10-CM

## 2024-12-18 PROCEDURE — 99214 OFFICE O/P EST MOD 30 MIN: CPT

## 2024-12-18 PROCEDURE — 99204 OFFICE O/P NEW MOD 45 MIN: CPT

## 2024-12-18 PROCEDURE — G2211 COMPLEX E/M VISIT ADD ON: CPT | Mod: NC

## 2024-12-18 PROCEDURE — 93000 ELECTROCARDIOGRAM COMPLETE: CPT

## 2024-12-20 RX ORDER — METOPROLOL SUCCINATE 25 MG/1
25 TABLET, EXTENDED RELEASE ORAL DAILY
Qty: 90 | Refills: 1 | Status: ACTIVE | COMMUNITY
Start: 2024-12-20 | End: 1900-01-01

## 2024-12-20 RX ORDER — NEBIVOLOL 5 MG/1
5 TABLET ORAL DAILY
Qty: 90 | Refills: 3 | Status: DISCONTINUED | COMMUNITY
Start: 2024-12-18 | End: 2024-12-20

## 2024-12-27 ENCOUNTER — APPOINTMENT (OUTPATIENT)
Dept: CARDIOLOGY | Facility: CLINIC | Age: 69
End: 2024-12-27
Payer: COMMERCIAL

## 2024-12-27 PROCEDURE — 93306 TTE W/DOPPLER COMPLETE: CPT

## 2025-03-13 ENCOUNTER — NON-APPOINTMENT (OUTPATIENT)
Age: 70
End: 2025-03-13

## 2025-03-13 ENCOUNTER — APPOINTMENT (OUTPATIENT)
Dept: CARDIOLOGY | Facility: CLINIC | Age: 70
End: 2025-03-13
Payer: MEDICAID

## 2025-03-13 VITALS
HEART RATE: 83 BPM | DIASTOLIC BLOOD PRESSURE: 78 MMHG | WEIGHT: 113 LBS | SYSTOLIC BLOOD PRESSURE: 102 MMHG | BODY MASS INDEX: 22.19 KG/M2 | HEIGHT: 60 IN

## 2025-03-13 DIAGNOSIS — E78.00 PURE HYPERCHOLESTEROLEMIA, UNSPECIFIED: ICD-10-CM

## 2025-03-13 DIAGNOSIS — F41.9 ANXIETY DISORDER, UNSPECIFIED: ICD-10-CM

## 2025-03-13 DIAGNOSIS — I10 ESSENTIAL (PRIMARY) HYPERTENSION: ICD-10-CM

## 2025-03-13 DIAGNOSIS — R00.2 PALPITATIONS: ICD-10-CM

## 2025-03-13 PROCEDURE — 99214 OFFICE O/P EST MOD 30 MIN: CPT

## 2025-03-13 PROCEDURE — 93000 ELECTROCARDIOGRAM COMPLETE: CPT

## 2025-03-17 ENCOUNTER — RX RENEWAL (OUTPATIENT)
Age: 70
End: 2025-03-17